# Patient Record
Sex: MALE | Race: NATIVE HAWAIIAN OR OTHER PACIFIC ISLANDER | NOT HISPANIC OR LATINO | Employment: FULL TIME | URBAN - METROPOLITAN AREA
[De-identification: names, ages, dates, MRNs, and addresses within clinical notes are randomized per-mention and may not be internally consistent; named-entity substitution may affect disease eponyms.]

---

## 2024-04-09 ENCOUNTER — OFFICE VISIT (OUTPATIENT)
Dept: ORTHOPEDIC SURGERY | Facility: CLINIC | Age: 49
End: 2024-04-09
Payer: COMMERCIAL

## 2024-04-09 VITALS
WEIGHT: 315 LBS | DIASTOLIC BLOOD PRESSURE: 100 MMHG | BODY MASS INDEX: 38.36 KG/M2 | HEIGHT: 76 IN | SYSTOLIC BLOOD PRESSURE: 145 MMHG

## 2024-04-09 DIAGNOSIS — M77.12 LATERAL EPICONDYLITIS OF LEFT ELBOW: ICD-10-CM

## 2024-04-09 DIAGNOSIS — M25.522 ELBOW PAIN, LEFT: Primary | ICD-10-CM

## 2024-04-09 DIAGNOSIS — S59.902A INJURY OF LEFT ELBOW, INITIAL ENCOUNTER: ICD-10-CM

## 2024-04-09 NOTE — PROGRESS NOTES
"    [facility] Note        Subjective     Pain of the Left Elbow      HPI    Isidoro Alonzo is a 48 y.o. male.  Patient is here today for left elbow pain.  He says he has had tennis elbow in both elbows for quite some time.  He says that the last time he has lifted weights and had to stop in 2023 secondary to his tennis elbow.  He says that he recently moved to the area and his wife has a very fancy washing machine that slipped out of the moving truck and he caught it and since that time has had pain in the antecubital fossa.  Has some numbness and tingling in his ulnar 2 digits.  He has had no treatment today.            Past Medical History:   Diagnosis Date    Fracture, radius     Fracture, ulna     Tear of meniscus of knee     Tendinitis of knee     Off/on knee and joint pain    Tennis elbow 2018    every year it gets worse      Past Surgical History:   Procedure Laterality Date    INNER EAR SURGERY      As a child      Family History   Problem Relation Age of Onset    Diabetes Mother     Diabetes Father      Social History     Socioeconomic History    Marital status:    Tobacco Use    Smoking status: Some Days     Current packs/day: 0.00     Types: Pipe, Cigars, Cigarettes     Last attempt to quit: 2020     Years since quittin.2     Passive exposure: Past    Smokeless tobacco: Never    Tobacco comments:     I smoke on rare occasions sheesha, pipe tobacco once every 6 or 7 months   Vaping Use    Vaping status: Some Days    Substances: Nicotine    Passive vaping exposure: Yes   Substance and Sexual Activity    Alcohol use: Yes     Alcohol/week: 4.0 standard drinks of alcohol     Types: 4 Drinks containing 0.5 oz of alcohol per week     Comment: Rum and Diet coke is my \"go-to\" drink.    Drug use: Never    Sexual activity: Yes     Partners: Female     Birth control/protection: I.U.D.     Comment: Normal sex life with my wife      No current outpatient " "medications on file prior to visit.     No current facility-administered medications on file prior to visit.      No Known Allergies       Review of Systems   Constitutional: Negative.    HENT: Negative.     Eyes: Negative.    Respiratory: Negative.     Cardiovascular: Negative.    Gastrointestinal: Negative.    Endocrine: Negative.    Genitourinary: Negative.    Musculoskeletal:  Positive for arthralgias.   Skin: Negative.    Allergic/Immunologic: Negative.    Neurological: Negative.    Hematological: Negative.    Psychiatric/Behavioral: Negative.          I reviewed the patient's chief complaint, history of present illness, review of systems, past medical history, surgical history, family history, social history, medications and allergy list.        Objective      Physical Exam  /100   Ht 192 cm (75.59\")   Wt (!) 153 kg (337 lb)   BMI 41.47 kg/m²     Body mass index is 41.47 kg/m².    General  Mental Status - alert  General Appearance - cooperative, well groomed, not in acute distress  Orientation - Oriented X3  Build & Nutrition - well developed and well nourished  Posture - normal posture  Gait - normal gait       Ortho Exam  Patient lacks about 10 degrees of full extension in the elbow.  He has full flexion.  He has full supination pronation of the forearm.  He has negative hook test.  He is tender over the biceps tendon however.  He has pain with resisted supination.  No pain with resisted elbow flexion.  He is tender over the lateral epicondyles well.    Imaging/Studies  Imaging Results (Last 24 Hours)       Procedure Component Value Units Date/Time    XR Elbow 3+ View Left [[id number]] Resulted: 04/09/24 0916     Updated: 04/09/24 0917    Narrative:      Left Elbow X-Ray    Indication: Pain    Views: AP, oblique and Lateral     Comparison: None    Findings:  No fracture  No bony lesion  Normal soft tissues  Degenerative changes are noted at the radial neck and also at the   ulnohumeral articulation " most clearly noted on the lateral x-ray.  There   is a small traction spur at the olecranon tip as well.    Impression: Degenerative changes left elbow.  No acute bony abnormality   noted.                      Assessment    Assessment:  1. Elbow pain, left    2. Injury of left elbow, initial encounter    3. Lateral epicondylitis of left elbow        Plan:  Continue over-the-counter medication as needed for discomfort  Left elbow pain with lateral epicondylitis and recent injury--concern is for injury to the distal biceps tendon.  I will get an MRI of his elbow.  Hopefully this checks out the integrity of the distal biceps tendon and insertion.  We will also be able to pay attention to his lateral epicondyle.  There is some early degenerative changes in the elbow but hopefully physical therapy can be a first-line of treatment.  Certainly if there is a full-thickness tear to the distal biceps then surgery will be a discussion we can have if needed.        Redd Burgos MD  04/09/24  09:27 EDT      Dictated Utilizing Dragon Dictation.

## 2024-04-22 DIAGNOSIS — M25.522 ELBOW PAIN, LEFT: ICD-10-CM

## 2024-04-22 DIAGNOSIS — S59.902A INJURY OF LEFT ELBOW, INITIAL ENCOUNTER: ICD-10-CM

## 2024-05-07 ENCOUNTER — OFFICE VISIT (OUTPATIENT)
Dept: ORTHOPEDIC SURGERY | Facility: CLINIC | Age: 49
End: 2024-05-07
Payer: COMMERCIAL

## 2024-05-07 VITALS
DIASTOLIC BLOOD PRESSURE: 106 MMHG | HEIGHT: 76 IN | SYSTOLIC BLOOD PRESSURE: 180 MMHG | WEIGHT: 315 LBS | BODY MASS INDEX: 38.36 KG/M2

## 2024-05-07 DIAGNOSIS — G56.22 CUBITAL TUNNEL SYNDROME ON LEFT: ICD-10-CM

## 2024-05-07 DIAGNOSIS — M25.522 ELBOW PAIN, LEFT: Primary | ICD-10-CM

## 2024-05-07 DIAGNOSIS — S59.902D INJURY OF LEFT ELBOW, SUBSEQUENT ENCOUNTER: ICD-10-CM

## 2024-05-07 DIAGNOSIS — M77.12 LATERAL EPICONDYLITIS OF LEFT ELBOW: ICD-10-CM

## 2024-05-07 PROCEDURE — 99213 OFFICE O/P EST LOW 20 MIN: CPT | Performed by: ORTHOPAEDIC SURGERY

## 2024-05-07 RX ORDER — VALSARTAN 160 MG/1
1 TABLET ORAL DAILY
COMMUNITY
Start: 2024-04-23